# Patient Record
Sex: FEMALE | Race: WHITE | Employment: FULL TIME | URBAN - METROPOLITAN AREA
[De-identification: names, ages, dates, MRNs, and addresses within clinical notes are randomized per-mention and may not be internally consistent; named-entity substitution may affect disease eponyms.]

---

## 2019-03-07 ENCOUNTER — OFFICE VISIT (OUTPATIENT)
Dept: ENDOCRINOLOGY | Age: 59
End: 2019-03-07

## 2019-03-07 VITALS
TEMPERATURE: 96.3 F | RESPIRATION RATE: 16 BRPM | SYSTOLIC BLOOD PRESSURE: 139 MMHG | WEIGHT: 195 LBS | HEIGHT: 62 IN | OXYGEN SATURATION: 96 % | HEART RATE: 72 BPM | BODY MASS INDEX: 35.88 KG/M2 | DIASTOLIC BLOOD PRESSURE: 60 MMHG

## 2019-03-07 DIAGNOSIS — E05.90 SUBCLINICAL HYPERTHYROIDISM: Primary | ICD-10-CM

## 2019-03-07 RX ORDER — BISMUTH SUBSALICYLATE 262 MG
1 TABLET,CHEWABLE ORAL DAILY
COMMUNITY

## 2019-03-07 NOTE — PROGRESS NOTES
[facility]               Rebecca Conteh MD              [address] [address]           Patient Information  Date:3/7/2019  Name : Antonio Patterson 63 y.o.     YOB: 1960         Referred by: Amira Vazquez MD         Chief Complaint   Patient presents with   [facility] New Patient     referred by Dr. Iliana Beckford for Thyroid       History of present illness    Antonio Patterson is a 63 y.o. female      she is here for evaluation and management of abnormal thyroid function tests. she was found to have low TSH with normal free T4 in November 2018, repeated in January 2019. No prior history of thyroid disease. At the time she had the labs she was suffering from URI. No prior history of thyroid disorder  No nervousness, shakiness, racing of the heart  No iodine exposure, steroid use, biotin use , not on any thyroid supplements. No change in the size of the neck or neck pain. No dysphagia,dysphonia or dyspnea. No A fib or osteoporosis    Mother had nodular goiter, status post lobectomy, no history of thyroid cancer      Past Medical History:   Diagnosis Date    Mild intermittent asthma        Current Outpatient Medications   Medication Sig    multivitamin (ONE A DAY) tablet Take 1 Tab by mouth daily. No current facility-administered medications for this visit. Review of Systems:  All 10 systems reviewed and are negative other than mentioned in HPI      Physical Examination:  Blood pressure 139/60, pulse 72, temperature 96.3 °F (35.7 °C), temperature source Oral, resp. rate 16, height 5' 1.5\" (1.562 m), weight 195 lb (88.5 kg), SpO2 96 %. Body mass index is 36.25 kg/m².   - General: pleasant, no distress, good eye contact  - HEENT: no exopthalmos, no periorbital edema, no scleral/conjunctival injection, EOMI, no lid lag or stare  - Neck: supple, no thyromegaly, nodules, lymph nodes,   - Cardiovascular: regular,  normal S1 and S2, no murmurs  - Respiratory: clear to auscultation bilaterally  - Gastrointestinal: soft, nontender, nondistended, BS +  - Musculoskeletal: no proximal muscle weakness in upper or lower extremities  - Integumentary: No tremors, no edema  - Neurological: alert and oriented   - Psychiatric: normal mood and affect  - Skin - normal turgor    Data Reviewed:       [] Reviewed labs    Assessment/Plan:     1. Subclinical hyperthyroidism          she is asymptomatic. Differentials are - Thyroiditis , lab variation , toxic nodule. Clinically could not appreciate any nodules. We will repeat thyroid function tests including TSH and free T4 along with T3. No known history of atrial fibrillation, CVD, osteoporosis. Further tests based on blood tests. No indication to treat subclinical hyperthyroidism unless TSH is suppressed, history of atrial fibrillation or has osteoporosis. Follow-up Disposition:  Return in about 6 months (around 9/7/2019) for labs bnv and follow up. Thank you for allowing me to participate in the care of this patient. Sarah Galeano MD      Patient Mabel Liu verbalized understanding    Voice-recognition software was used to generate this report, which may result in some phonetic-based errors in the grammar and contents. Even though attempts were made to correct all the mistakes, some may have been missed and remained in the body of the report.

## 2019-03-07 NOTE — PROGRESS NOTES
Kwame Barger is a 63 y.o. female here for   Chief Complaint   Patient presents with    New Patient     referred by Dr. Liza Welch for Thyroid       1. Have you been to the ER, urgent care clinic since your last visit? Hospitalized since your last visit? -n/a    2. Have you seen or consulted any other health care providers outside of the [address] [place] [address] since your last visit?   Include any pap smears or colon screening.-n/a

## 2019-03-08 LAB
T4 FREE SERPL-MCNC: 1.58 NG/DL (ref 0.82–1.77)
TSH SERPL DL<=0.005 MIU/L-ACNC: 0.01 UIU/ML (ref 0.45–4.5)

## 2019-03-10 DIAGNOSIS — E05.90 SUBCLINICAL HYPERTHYROIDISM: Primary | ICD-10-CM

## 2019-03-11 ENCOUNTER — TELEPHONE (OUTPATIENT)
Dept: ENDOCRINOLOGY | Age: 59
End: 2019-03-11

## 2019-03-11 NOTE — TELEPHONE ENCOUNTER
Attempted to call. Unsuccessful. Left msg for Ramesh Rosenbaum to give us a call back at the office. A callback number was left.

## 2019-03-11 NOTE — TELEPHONE ENCOUNTER
Per Dr. Shaggy Valle, informed pt of result note, as noted above. Pt verbalized understanding with no further questions or concerns at this time.

## 2019-03-11 NOTE — TELEPHONE ENCOUNTER
----- Message from Denver Shi, MD sent at 3/10/2019  6:07 PM EDT -----  Thyroid test showed that she has a fast thyroid, need additional scanning to further evaluate.

## 2019-03-26 ENCOUNTER — HOSPITAL ENCOUNTER (OUTPATIENT)
Dept: NUCLEAR MEDICINE | Age: 59
Discharge: HOME | End: 2019-03-26
Attending: INTERNAL MEDICINE
Payer: COMMERCIAL

## 2019-03-26 DIAGNOSIS — E05.90 SUBCLINICAL HYPERTHYROIDISM: ICD-10-CM

## 2019-03-26 PROCEDURE — 78014 THYROID IMAGING W/BLOOD FLOW: CPT

## 2019-03-27 ENCOUNTER — HOSPITAL ENCOUNTER (OUTPATIENT)
Dept: NUCLEAR MEDICINE | Age: 59
Discharge: HOME | End: 2019-03-27
Attending: INTERNAL MEDICINE
Payer: COMMERCIAL

## 2019-03-27 ENCOUNTER — HOSPITAL ENCOUNTER (OUTPATIENT)
Dept: ULTRASOUND IMAGING | Age: 59
Discharge: HOME | End: 2019-03-27
Attending: INTERNAL MEDICINE
Payer: COMMERCIAL

## 2019-03-27 DIAGNOSIS — E05.90 SUBCLINICAL HYPERTHYROIDISM: ICD-10-CM

## 2019-03-27 PROCEDURE — 76536 US EXAM OF HEAD AND NECK: CPT

## 2019-03-28 ENCOUNTER — TELEPHONE (OUTPATIENT)
Dept: ENDOCRINOLOGY | Age: 59
End: 2019-03-28

## 2019-03-28 NOTE — PROGRESS NOTES
There are no nodules  seen on the thyroid gland which is good. It is slightly overactive, at this point there is no need for any medications. Would monitor it and then if it is worsening need medicines to control the thyroid.

## 2019-03-28 NOTE — TELEPHONE ENCOUNTER
Attempted to call. Unsuccessful. Left msg for Yasemin Gloria to give us a call back at the office. A callback number was left.

## 2019-03-28 NOTE — TELEPHONE ENCOUNTER
----- Message from Miguelina Ziegler MD sent at 3/28/2019  3:13 PM EDT -----  There are no nodules  seen on the thyroid gland which is good. It is slightly overactive, at this point there is no need for any medications. Would monitor it and then if it is worsening need medicines to control the thyroid.

## 2019-03-29 NOTE — TELEPHONE ENCOUNTER
Per Dr. Laxmi Gupta, informed pt of result note, as noted above. Pt verbalized understanding with no further questions or concerns at this time.

## 2019-09-05 DIAGNOSIS — E05.90 SUBCLINICAL HYPERTHYROIDISM: ICD-10-CM

## 2019-09-06 LAB
T4 FREE SERPL-MCNC: 1.63 NG/DL (ref 0.82–1.77)
TSH SERPL DL<=0.005 MIU/L-ACNC: <0.006 UIU/ML (ref 0.45–4.5)

## 2019-09-12 ENCOUNTER — OFFICE VISIT (OUTPATIENT)
Dept: ENDOCRINOLOGY | Age: 59
End: 2019-09-12

## 2019-09-12 VITALS
HEART RATE: 86 BPM | TEMPERATURE: 97.6 F | BODY MASS INDEX: 34.78 KG/M2 | WEIGHT: 189 LBS | SYSTOLIC BLOOD PRESSURE: 152 MMHG | OXYGEN SATURATION: 98 % | RESPIRATION RATE: 16 BRPM | HEIGHT: 62 IN | DIASTOLIC BLOOD PRESSURE: 71 MMHG

## 2019-09-12 DIAGNOSIS — E05.90 SUBCLINICAL HYPERTHYROIDISM: Primary | ICD-10-CM

## 2019-09-12 DIAGNOSIS — E04.1 THYROID NODULE: ICD-10-CM

## 2019-09-12 DIAGNOSIS — E05.00 GRAVES DISEASE: ICD-10-CM

## 2019-09-12 RX ORDER — METHIMAZOLE 5 MG/1
5 TABLET ORAL
Qty: 90 TAB | Refills: 3 | Status: SHIPPED | OUTPATIENT
Start: 2019-09-12 | End: 2020-07-29 | Stop reason: SDUPTHER

## 2019-09-12 NOTE — PROGRESS NOTES
Kaylyn Rondon is a 63 y.o. female here for   Chief Complaint   Patient presents with    Thyroid Problem       1. Have you been to the ER, urgent care clinic since your last visit? Hospitalized since your last visit? -no    2. Have you seen or consulted any other health care providers outside of the [address] [place] [address] since your last visit?   Include any pap smears or colon screening.-no

## 2019-09-12 NOTE — LETTER
9/12/19 Patient: Julien Osullivan YOB: 1960 Date of Visit: 9/12/2019 Raghu Sweeney MD 
[address] [facility] [address] [place] [address] VIA Facsimile: [phone] Dear Raghu Sweeney MD, Thank you for referring Ms. Christiano Thibodeaux to [address] [place] [address] for evaluation. My notes for this consultation are attached. If you have questions, please do not hesitate to call me. I look forward to following your patient along with you. Sincerely, Mio Peter MD

## 2019-09-12 NOTE — PROGRESS NOTES
Vinod Vega MD              [address] [id number]           Patient Information  Date:9/12/2019  Name : Tayler Viramontes 63 y.o.     YOB: 1960         Referred by: Maria De Jesus Barrios MD         Chief Complaint   Patient presents with    Thyroid Problem       History of present illness    Tayler Viramontes is a 63 y.o. female       she was found to have low TSH with normal free T4 in November 2018, repeated in January 2019. TSH suppressed in September 2019  Thyroid uptake and scan showed elevated uptake of 36%, no dominant nodule on ultrasound  Chronic tremors which has not changed       No A fib or osteoporosis    Mother had nodular goiter, status post lobectomy, no history of thyroid cancer      Past Medical History:   Diagnosis Date    Mild intermittent asthma        Current Outpatient Medications   Medication Sig    multivitamin (ONE A DAY) tablet Take 1 Tab by mouth daily.  methIMAzole (TAPAZOLE) 5 mg tablet Take 1 Tab by mouth every morning. No current facility-administered medications for this visit. Review of Systems:  All 10 systems reviewed and are negative other than mentioned in HPI      Physical Examination:  Blood pressure 152/71, pulse 86, temperature 97.6 °F (36.4 °C), temperature source Oral, resp. rate 16, height 5' 1.5\" (1.562 m), weight 189 lb (85.7 kg), SpO2 98 %. Body mass index is 35.13 kg/m².   - General: pleasant, no distress, good eye contact  - HEENT: no exopthalmos, no periorbital edema, no scleral/conjunctival injection, EOMI, no lid lag or stare  - Neck: supple, no thyromegaly, nodules, lymph nodes,   - Cardiovascular: regular,  normal S1 and S2, no murmurs  - Respiratory: clear to auscultation bilaterally  - Gastrointestinal: soft, nontender, nondistended, BS +  - Musculoskeletal: no proximal muscle weakness in upper or lower extremities  - Integumentary: No tremors, no edema  - Neurological: alert and oriented   - Psychiatric: normal mood and affect  - Skin - normal turgor    Data Reviewed:       [] Reviewed labs    Assessment/Plan:     1. Subclinical hyperthyroidism    2. Graves disease    3. Thyroid nodule        Early Graves' disease  Methimazole started September 2019    Thyroid nodule: Subcentimeter, no follow-up ultrasound needed IR FNA        Follow-up and Dispositions    · Return in about 6 months (around 3/12/2020) for labs 3 M and 6 M . Thank you for allowing me to participate in the care of this patient. Jason North MD      Patient Hollis Panchal verbalized understanding    Voice-recognition software was used to generate this report, which may result in some phonetic-based errors in the grammar and contents. Even though attempts were made to correct all the mistakes, some may have been missed and remained in the body of the report.

## 2020-01-07 ENCOUNTER — HOSPITAL ENCOUNTER (OUTPATIENT)
Dept: LAB | Age: 60
Discharge: HOME | End: 2020-01-07

## 2020-01-07 DIAGNOSIS — E05.90 SUBCLINICAL HYPERTHYROIDISM: ICD-10-CM

## 2020-01-07 LAB
T4 FREE SERPL-MCNC: 1.1 NG/DL (ref 0.8–1.5)
TSH SERPL DL<=0.05 MIU/L-ACNC: 0.04 UIU/ML (ref 0.36–3.74)

## 2020-02-13 ENCOUNTER — OFFICE VISIT (OUTPATIENT)
Dept: OBGYN CLINIC | Age: 60
End: 2020-02-13

## 2020-02-13 VITALS
BODY MASS INDEX: 36.63 KG/M2 | WEIGHT: 194 LBS | HEART RATE: 69 BPM | HEIGHT: 61 IN | DIASTOLIC BLOOD PRESSURE: 60 MMHG | SYSTOLIC BLOOD PRESSURE: 125 MMHG

## 2020-02-13 DIAGNOSIS — Z01.419 ROUTINE GYNECOLOGICAL EXAMINATION: Primary | ICD-10-CM

## 2020-02-13 DIAGNOSIS — Z11.51 SPECIAL SCREENING EXAMINATION FOR HUMAN PAPILLOMAVIRUS (HPV): ICD-10-CM

## 2020-02-13 DIAGNOSIS — E66.01 SEVERE OBESITY (HCC): ICD-10-CM

## 2020-02-13 NOTE — PATIENT INSTRUCTIONS
Well Visit, Women 48 to 72: Care Instructions  Your Care Instructions    Physical exams can help you stay healthy. Your doctor has checked your overall health and may have suggested ways to take good care of yourself. He or she also may have recommended tests. At home, you can help prevent illness with healthy eating, regular exercise, and other steps. Follow-up care is a key part of your treatment and safety. Be sure to make and go to all appointments, and call your doctor if you are having problems. It's also a good idea to know your test results and keep a list of the medicines you take. How can you care for yourself at home? · Reach and stay at a healthy weight. This will lower your risk for many problems, such as obesity, diabetes, heart disease, and high blood pressure. · Get at least 30 minutes of exercise on most days of the week. Walking is a good choice. You also may want to do other activities, such as running, swimming, cycling, or playing tennis or team sports. · Do not smoke. Smoking can make health problems worse. If you need help quitting, talk to your doctor about stop-smoking programs and medicines. These can increase your chances of quitting for good. · Protect your skin from too much sun. When you're outdoors from 10 a.m. to 4 p.m., stay in the shade or cover up with clothing and a hat with a wide brim. Wear sunglasses that block UV rays. Even when it's cloudy, put broad-spectrum sunscreen (SPF 30 or higher) on any exposed skin. · See a dentist one or two times a year for checkups and to have your teeth cleaned. · Wear a seat belt in the car. Follow your doctor's advice about when to have certain tests. These tests can spot problems early. · Cholesterol. Your doctor will tell you how often to have this done based on your age, family history, or other things that can increase your risk for heart attack and stroke. · Blood pressure.  Have your blood pressure checked during a routine doctor visit. Your doctor will tell you how often to check your blood pressure based on your age, your blood pressure results, and other factors. · Mammogram. Ask your doctor how often you should have a mammogram, which is an X-ray of your breasts. A mammogram can spot breast cancer before it can be felt and when it is easiest to treat. · Pap test and pelvic exam. Ask your doctor how often you should have a Pap test. You may not need to have a Pap test as often as you used to. · Vision. Have your eyes checked every year or two or as often as your doctor suggests. Some experts recommend that you have yearly exams for glaucoma and other age-related eye problems starting at age 48. · Hearing. Tell your doctor if you notice any change in your hearing. You can have tests to find out how well you hear. · Diabetes. Ask your doctor whether you should have tests for diabetes. · Colorectal cancer. Your risk for colorectal cancer gets higher as you get older. Some experts say that adults should start regular screening at age 48 and stop at age 76. Others say to start before age 48 or continue after age 76. Talk with your doctor about your risk and when to start and stop screening. · Thyroid disease. Talk to your doctor about whether to have your thyroid checked as part of a regular physical exam. Women have an increased chance of a thyroid problem. · Osteoporosis. You should begin tests for bone density at age 72. If you are younger than 72, ask your doctor whether you have factors that may increase your risk for this disease. You may want to have this test before age 72. · Heart attack and stroke risk. At least every 4 to 6 years, you should have your risk for heart attack and stroke assessed. Your doctor uses factors such as your age, blood pressure, cholesterol, and whether you smoke or have diabetes to show what your risk for a heart attack or stroke is over the next 10 years.   When should you call for help?  Watch closely for changes in your health, and be sure to contact your doctor if you have any problems or symptoms that concern you. Where can you learn more? Go to http://nadia-hitesh.info/. Enter W612 in the search box to learn more about \"Well Visit, Women 50 to 72: Care Instructions. \"  Current as of: December 13, 2018  Content Version: 12.2  © 9939-5436 [employer]. Care instructions adapted under license by [employer] (which disclaims liability or warranty for this information). If you have questions about a medical condition or this instruction, always ask your healthcare professional. [address] any warranty or liability for your use of this information.

## 2020-02-13 NOTE — PROGRESS NOTES
Tara An is a No obstetric history on file. ,  63 y.o. female [facility]  who presents for her annual checkup. She is having no significant problems. Menstrual status:    Her periods are absent in flow. She denies dysmenorrhea. She reports no premenstrual symptoms. The patient is not using HRT. Contraception:    The current method of family planning is post menopausal status. Sexual history:    She  reports previously being sexually active. Medical conditions:    Since her last annual GYN exam about unknown per patient ago, she has had the following changes in her health history: none. Pap and Mammogram History:    Her most recent Pap smear was normal obtained unknown per patient year(s) ago. Per patient \"I had abnormal Pap when I was maybe 19\". The patient had her mammogram today in our office. Breast Cancer History/Substance Abuse:    She has no and a family history of breast cancer. Osteoporosis History:    Family history does not include a first or second degree relative with osteopenia or osteoporosis. A bone density scan was not obtained. She is not currently taking calcium and vit D. Past Medical History:   Diagnosis Date    Mild intermittent asthma      Past Surgical History:   Procedure Laterality Date    HX  SECTION       Current Outpatient Medications   Medication Sig Dispense Refill    methIMAzole (TAPAZOLE) 5 mg tablet Take 1 Tab by mouth every morning. 90 Tab 3    multivitamin (ONE A DAY) tablet Take 1 Tab by mouth daily.        Allergies: Aspirin   Social History     Socioeconomic History    Marital status:      Spouse name: Not on file    Number of children: Not on file    Years of education: Not on file    Highest education level: Not on file   Occupational History    Not on file   Social Needs    Financial resource strain: Not on file    Food insecurity:     Worry: Not on file     Inability: Not on file   [facility] Transportation needs:     Medical: Not on file     Non-medical: Not on file   Tobacco Use    Smoking status: Former Smoker    Smokeless tobacco: Never Used   Substance and Sexual Activity    Alcohol use: Yes     Drinks per session: 1 or 2     Comment: occasional    Drug use: No    Sexual activity: Not on file   Lifestyle    Physical activity:     Days per week: Not on file     Minutes per session: Not on file    Stress: Not on file   Relationships    Social connections:     Talks on phone: Not on file     Gets together: Not on file     Attends Jainism service: Not on file     Active member of club or organization: Not on file     Attends meetings of clubs or organizations: Not on file     Relationship status: Not on file    Intimate partner violence:     Fear of current or ex partner: Not on file     Emotionally abused: Not on file     Physically abused: Not on file     Forced sexual activity: Not on file   Other Topics Concern    Not on file   Social History Narrative    Not on file     Tobacco History:  reports that she has quit smoking. She has never used smokeless tobacco.  Alcohol Abuse:  reports current alcohol use. Drug Abuse:  reports no history of drug use.   Patient Active Problem List   Diagnosis Code    Subclinical hyperthyroidism E05.90    Graves disease E05.00    Thyroid nodule E04.1         Review of Systems - History obtained from the patient  Constitutional: negative for weight loss, fever, night sweats  HEENT: negative for hearing loss, earache, congestion, snoring, sorethroat  CV: negative for chest pain, palpitations, edema  Resp: negative for cough, shortness of breath, wheezing  GI: negative for change in bowel habits, abdominal pain, black or bloody stools  : negative for frequency, dysuria, hematuria, vaginal discharge  MSK: negative for back pain, joint pain, muscle pain  Breast: negative for breast lumps, nipple discharge, galactorrhea  Skin :negative for itching, rash, hives  Neuro: negative for dizziness, headache, confusion, weakness  Psych: negative for anxiety, depression, change in mood  Heme/lymph: negative for bleeding, bruising, pallor    Physical Exam    Visit Vitals  /60   Pulse 69   Ht 5' 1\" (1.549 m)   Wt 194 lb (88 kg)   BMI 36.66 kg/m²     Constitutional  · Appearance: well-nourished, well developed, alert, in no acute distress    HENT  · Head and Face: appears normal    Neck  · Inspection/Palpation: normal appearance, no masses or tenderness  · Lymph Nodes: no lymphadenopathy present  · Thyroid: gland size normal, nontender, no nodules or masses present on palpation    Chest  · Respiratory Effort: breathing normal  · Auscultation: normal breath sounds    Cardiovascular  · Heart:  · Auscultation: regular rate and rhythm without murmur    Breasts  · Inspection of Breasts: breasts symmetrical, no skin changes, no discharge present, nipple appearance normal, no skin retraction present  · Palpation of Breasts and Axillae: no masses present on palpation, no breast tenderness  · Axillary Lymph Nodes: no lymphadenopathy present    Gastrointestinal  · Abdominal Examination: abdomen non-tender to palpation, normal bowel sounds, no masses present  · Liver and spleen: no hepatomegaly present, spleen not palpable  · Hernias: no hernias identified    Skin  · General Inspection: no rash, no lesions identified    Neurologic/Psychiatric  · Mental Status:  · Orientation: grossly oriented to person, place and time  · Mood and Affect: mood normal, affect appropriate    Genitourinary  · External Genitalia: normal appearance for age, no discharge present, no tenderness present, no inflammatory lesions present, no masses present, no atrophy present  · Vagina: normal vaginal vault without central or paravaginal defects, no discharge present, no inflammatory lesions present, no masses present  · Bladder: non-tender to palpation  · Urethra: appears normal  · Cervix: normal   · Uterus: normal size, shape and consistency  · Adnexa: no adnexal tenderness present, no adnexal masses present  · Perineum: perineum within normal limits, no evidence of trauma, no rashes or skin lesions present  · Anus: anus within normal limits, no hemorrhoids present  · Inguinal Lymph Nodes: no lymphadenopathy present    Assessment:  Routine gynecologic examination  Her current medical status is satisfactory with no evidence of significant gynecologic issues.     Plan:  Counseled re: diet, exercise, healthy lifestyle  Return for yearly wellness visits  Rec annual mammogram  Pap/HPV

## 2020-02-18 LAB
CYTOLOGIST CVX/VAG CYTO: NORMAL
CYTOLOGY CVX/VAG DOC CYTO: NORMAL
CYTOLOGY CVX/VAG DOC THIN PREP: NORMAL
DX ICD CODE: NORMAL
HPV I/H RISK 1 DNA CVX QL PROBE+SIG AMP: NEGATIVE
Lab: NORMAL
OTHER STN SPEC: NORMAL
STAT OF ADQ CVX/VAG CYTO-IMP: NORMAL

## 2020-03-18 ENCOUNTER — HOSPITAL ENCOUNTER (OUTPATIENT)
Dept: LAB | Age: 60
Discharge: HOME | End: 2020-03-18

## 2020-03-18 ENCOUNTER — LAB ONLY (OUTPATIENT)
Dept: ENDOCRINOLOGY | Age: 60
End: 2020-03-18

## 2020-03-18 DIAGNOSIS — E05.90 SUBCLINICAL HYPERTHYROIDISM: ICD-10-CM

## 2020-03-18 DIAGNOSIS — E05.90 SUBCLINICAL HYPERTHYROIDISM: Primary | ICD-10-CM

## 2020-03-18 LAB
T4 FREE SERPL-MCNC: 0.9 NG/DL (ref 0.8–1.5)
TSH SERPL DL<=0.05 MIU/L-ACNC: 3.43 UIU/ML (ref 0.36–3.74)

## 2020-03-19 ENCOUNTER — TELEPHONE (OUTPATIENT)
Dept: ENDOCRINOLOGY | Age: 60
End: 2020-03-19

## 2020-07-29 ENCOUNTER — OFFICE VISIT (OUTPATIENT)
Dept: ENDOCRINOLOGY | Age: 60
End: 2020-07-29

## 2020-07-29 VITALS
RESPIRATION RATE: 18 BRPM | SYSTOLIC BLOOD PRESSURE: 148 MMHG | HEART RATE: 75 BPM | DIASTOLIC BLOOD PRESSURE: 70 MMHG | OXYGEN SATURATION: 96 % | TEMPERATURE: 97.1 F | HEIGHT: 61 IN | WEIGHT: 205 LBS | BODY MASS INDEX: 38.71 KG/M2

## 2020-07-29 DIAGNOSIS — E66.01 SEVERE OBESITY (HCC): ICD-10-CM

## 2020-07-29 DIAGNOSIS — E04.1 THYROID NODULE: ICD-10-CM

## 2020-07-29 DIAGNOSIS — E05.90 SUBCLINICAL HYPERTHYROIDISM: ICD-10-CM

## 2020-07-29 DIAGNOSIS — E05.00 GRAVES DISEASE: ICD-10-CM

## 2020-07-29 DIAGNOSIS — E05.90 SUBCLINICAL HYPERTHYROIDISM: Primary | ICD-10-CM

## 2020-07-29 RX ORDER — METHIMAZOLE 5 MG/1
5 TABLET ORAL
Qty: 90 TAB | Refills: 3 | Status: SHIPPED | OUTPATIENT
Start: 2020-07-29 | End: 2020-08-03 | Stop reason: SDUPTHER

## 2020-07-29 NOTE — PROGRESS NOTES
Raquel Amaya MD      Patient Information  Date:7/29/2020  Name : Izabella Santos 62 y.o.     YOB: 1960         Referred by: Sussy Benitez MD         Chief Complaint   Patient presents with    Thyroid Problem       History of present illness    Izabella Santos is a 62 y.o. female       she was found to have low TSH with normal free T4 in November 2018, repeated in January 2019. TSH suppressed in September 2019  Thyroid uptake and scan showed elevated uptake of 36%, no dominant nodule on ultrasound  Chronic tremors which has not changed  She is on methimazole which she is taking it consistently  No fever or cough  No sores in the mouth     No A fib or osteoporosis    Mother had nodular goiter, status post lobectomy, no history of thyroid cancer      Past Medical History:   Diagnosis Date    Mild intermittent asthma     Routine Papanicolaou smear 2/13/2020 neg HPV neg    Thyroid disease        Current Outpatient Medications   Medication Sig    methIMAzole (TAPAZOLE) 5 mg tablet Take 1 Tab by mouth every morning.  multivitamin (ONE A DAY) tablet Take 1 Tab by mouth daily. No current facility-administered medications for this visit. Review of Systems:  All 10 systems reviewed and are negative other than mentioned in HPI      Physical Examination:  Blood pressure 148/70, pulse 75, temperature 97.1 °F (36.2 °C), temperature source Oral, resp. rate 18, height 5' 1\" (1.549 m), weight 205 lb (93 kg), SpO2 96 %. Body mass index is 38.73 kg/m².   - General: pleasant, no distress, good eye contact  - HEENT: no exopthalmos, no periorbital edema, no scleral/conjunctival injection, EOMI, no lid lag or stare  - Neck: supple, no thyromegaly, nodules, lymph nodes,   - Cardiovascular: regular,  normal S1 and S2, no murmurs  - Respiratory: clear to auscultation bilaterally  - Gastrointestinal: soft, nontender, nondistended, BS +  - Musculoskeletal: no proximal muscle weakness in upper or lower extremities  - Integumentary: No tremors, no edema  - Neurological: alert and oriented   - Psychiatric: normal mood and affect  - Skin - normal turgor    Data Reviewed:       [] Reviewed labs    Assessment/Plan:     1. Subclinical hyperthyroidism    2. Graves disease    3. Thyroid nodule        Early Graves' disease  Methimazole started September 2019    Thyroid nodule: Subcentimeter, no follow-up ultrasound needed    Nonmorbid obesity: Lifestyle changes    Blood pressure is elevated today, monitor          Thank you for allowing me to participate in the care of this patient. Nicolle Bailey MD      Patient [facility] verbalized understanding    Voice-recognition software was used to generate this report, which may result in some phonetic-based errors in the grammar and contents. Even though attempts were made to correct all the mistakes, some may have been missed and remained in the body of the report.

## 2020-07-29 NOTE — LETTER
7/30/20 Patient: Faustino Laurent YOB: 1960 Date of Visit: 7/29/2020 Emmanuel Black MD 
[address] [address] [place] [address] VIA Facsimile: [phone] Dear Emmanuel Black MD, Thank you for referring Ms. Nadiya Bates to [id number] [place] [address] for evaluation. My notes for this consultation are attached. If you have questions, please do not hesitate to call me. I look forward to following your patient along with you. Sincerely, Radhika Christian MD

## 2020-07-29 NOTE — PROGRESS NOTES
Jeni Smith is a 62 y.o. female here for   Chief Complaint   Patient presents with    Thyroid Problem       1. Have you been to the ER, urgent care clinic since your last visit? Hospitalized since your last visit? -no    2. Have you seen or consulted any other health care providers outside of the [address] [place] [address] since your last visit? Include any pap smears or colon screening. -PCP and GYN

## 2020-08-01 LAB
T4 FREE SERPL-MCNC: 0.98 NG/DL (ref 0.82–1.77)
TSH SERPL DL<=0.005 MIU/L-ACNC: 6.18 UIU/ML (ref 0.45–4.5)

## 2020-08-03 ENCOUNTER — TELEPHONE (OUTPATIENT)
Dept: ENDOCRINOLOGY | Age: 60
End: 2020-08-03

## 2020-08-03 DIAGNOSIS — E05.90 SUBCLINICAL HYPERTHYROIDISM: ICD-10-CM

## 2020-08-03 RX ORDER — METHIMAZOLE 5 MG/1
2.5 TABLET ORAL
Qty: 45 TAB | Refills: 3 | Status: SHIPPED | OUTPATIENT
Start: 2020-08-03 | End: 2021-01-29

## 2020-08-03 NOTE — TELEPHONE ENCOUNTER
----- Message from Krishan Haile MD sent at 8/2/2020  7:39 AM EDT -----  Decrease methimazole to 2.5 mg, which is half tablet of 5 mg

## 2020-08-03 NOTE — TELEPHONE ENCOUNTER
Per Dr. Phyllis Stuart, informed pt of result note, as noted above. Pt verbalized understanding and would like updated dose to pharmacy. Informed her that 5 mg is the lowest form methimazole comes in. Pt verbalized understanding with no further questions or concerns at this time.

## 2021-01-01 DIAGNOSIS — E04.1 THYROID NODULE: ICD-10-CM

## 2021-01-01 DIAGNOSIS — E05.90 SUBCLINICAL HYPERTHYROIDISM: ICD-10-CM

## 2021-01-01 DIAGNOSIS — E05.00 GRAVES DISEASE: ICD-10-CM

## 2021-01-22 LAB
T4 FREE SERPL-MCNC: 1.1 NG/DL (ref 0.8–1.5)
TSH SERPL DL<=0.05 MIU/L-ACNC: 2.53 UIU/ML (ref 0.36–3.74)

## 2021-01-29 ENCOUNTER — OFFICE VISIT (OUTPATIENT)
Dept: ENDOCRINOLOGY | Age: 61
End: 2021-01-29
Payer: COMMERCIAL

## 2021-01-29 VITALS
HEART RATE: 71 BPM | OXYGEN SATURATION: 96 % | TEMPERATURE: 97.6 F | RESPIRATION RATE: 16 BRPM | WEIGHT: 201 LBS | BODY MASS INDEX: 37.95 KG/M2 | SYSTOLIC BLOOD PRESSURE: 143 MMHG | DIASTOLIC BLOOD PRESSURE: 65 MMHG | HEIGHT: 61 IN

## 2021-01-29 DIAGNOSIS — E04.1 THYROID NODULE: ICD-10-CM

## 2021-01-29 DIAGNOSIS — E05.90 SUBCLINICAL HYPERTHYROIDISM: Primary | ICD-10-CM

## 2021-01-29 DIAGNOSIS — E05.00 GRAVES DISEASE: ICD-10-CM

## 2021-01-29 PROCEDURE — 99214 OFFICE O/P EST MOD 30 MIN: CPT | Performed by: INTERNAL MEDICINE

## 2021-01-29 RX ORDER — ALBUTEROL SULFATE 90 UG/1
2 AEROSOL, METERED RESPIRATORY (INHALATION)
COMMUNITY

## 2021-01-29 NOTE — LETTER
1/30/2021 Patient: Marilu Mail YOB: 1960 Date of Visit: 1/29/2021 Rickie Wong MD 
[address] [place] [address] Via Fax: [phone] Dear Rickie Wong MD, Thank you for referring Ms. Elissa García to [id number] [place] [address] for evaluation. My notes for this consultation are attached. If you have questions, please do not hesitate to call me. I look forward to following your patient along with you. Sincerely, Juan Prajapati MD

## 2021-01-29 NOTE — PROGRESS NOTES
Catia Varner is a 62 y.o. female here for   Chief Complaint   Patient presents with    Thyroid Problem       1. Have you been to the ER, urgent care clinic since your last visit? Hospitalized since your last visit? -no    2. Have you seen or consulted any other health care providers outside of the [address] [place] [address] since your last visit?   Include any pap smears or colon screening.-no

## 2021-01-29 NOTE — PROGRESS NOTES
Jasen Anders MD      Patient Information  Date:1/30/2021  Name : Mario Fang 62 y.o.     YOB: 1960         Referred by: Al Melendrez MD         Chief Complaint   Patient presents with    Thyroid Problem       History of present illness    Mario Fang is a 62 y.o. female here for follow-up     she was found to have low TSH with normal free T4 in November 2018, repeated in January 2019. TSH suppressed in September 2019  Thyroid uptake and scan showed elevated uptake of 36%, no dominant nodule on ultrasound  Chronic tremors which has not changed  She is on methimazole which she is taking it consistently  No fever or cough  No sores in the mouth     No A fib or osteoporosis    Mother had nodular goiter, status post lobectomy, no history of thyroid cancer      Past Medical History:   Diagnosis Date    Mild intermittent asthma     Routine Papanicolaou smear 2/13/2020 neg HPV neg    Thyroid disease        Current Outpatient Medications   Medication Sig    albuterol (PROVENTIL HFA, VENTOLIN HFA, PROAIR HFA) 90 mcg/actuation inhaler Take 2 Puffs by inhalation every four (4) hours as needed for Wheezing.  multivitamin (ONE A DAY) tablet Take 1 Tab by mouth daily. No current facility-administered medications for this visit. Review of Systems:  All 10 systems reviewed and are negative other than mentioned in HPI      Physical Examination:  Blood pressure (!) 143/65, pulse 71, temperature 97.6 °F (36.4 °C), temperature source Oral, resp. rate 16, height 5' 1\" (1.549 m), weight 201 lb (91.2 kg), SpO2 96 %. Body mass index is 37.98 kg/m².   - General: pleasant, no distress, good eye contact  - HEENT: no exopthalmos, no periorbital edema, no scleral/conjunctival injection, EOMI, no lid lag or stare  - Neck: supple, no thyromegaly, nodules, lymph nodes,   - Cardiovascular: regular,  normal S1 and S2, no murmurs  - Respiratory: clear to auscultation bilaterally  - Gastrointestinal: soft, nontender, nondistended, BS +  - Musculoskeletal: no proximal muscle weakness in upper or lower extremities  - Integumentary: No tremors, no edema  - Neurological: alert and oriented   - Psychiatric: normal mood and affect  - Skin - normal turgor    Data Reviewed:       [] Reviewed labs    Assessment/Plan:     1. Subclinical hyperthyroidism    2. Graves disease        Early Graves' disease  Methimazole started September 2019  Stop methimazole at the end of March 2021  Recheck thyroid function test end of June 2021, if normal annual thyroid function tests    Thyroid nodule: Subcentimeter, no follow-up ultrasound needed    Nonmorbid obesity: Lifestyle changes          Follow-up and Dispositions    · Return if symptoms worsen or fail to improve. Thank you for allowing me to participate in the care of this patient. Santos Betancourt MD      Patient Vanessa  verbalized understanding    Voice-recognition software was used to generate this report, which may result in some phonetic-based errors in the grammar and contents. Even though attempts were made to correct all the mistakes, some may have been missed and remained in the body of the report.

## 2021-08-28 LAB
T4 FREE SERPL-MCNC: 1.18 NG/DL (ref 0.82–1.77)
TSH SERPL DL<=0.005 MIU/L-ACNC: 2.84 UIU/ML (ref 0.45–4.5)

## 2021-08-29 NOTE — PROGRESS NOTES
Thyroid tests are normal.  Plan was to stop the methimazole and please confirm if she has stopped it. Follow-up in a year, TSH, free T4 before next visit

## 2021-08-30 ENCOUNTER — TELEPHONE (OUTPATIENT)
Dept: ENDOCRINOLOGY | Age: 61
End: 2021-08-30

## 2021-08-30 DIAGNOSIS — E05.90 SUBCLINICAL HYPERTHYROIDISM: Primary | ICD-10-CM

## 2021-08-30 NOTE — TELEPHONE ENCOUNTER
Pt confirmed she did stop methimazole. Informed pt of results below. Pt verbalized understanding. Order placed for pt,  per Verbal Order with read back from Dr Isaias Hunt 8/30/2021        ----- Message from Carmen Kiser MD sent at 8/29/2021  7:23 AM EDT -----  Thyroid tests are normal.  Plan was to stop the methimazole and please confirm if she has stopped it. Follow-up in a year, TSH, free T4 before next visit

## 2021-10-14 NOTE — PATIENT INSTRUCTIONS
Potential side effects of methimazole are rash,low blood count and rarely liver inflammation. If you get high grade fever,bad sorethroat,or sores in the mouth ,please call the office for blood tests. If white blood count is low,the methimazole should be stopped and the counts will normalize in 7 to 10 days. show

## 2022-03-18 PROBLEM — E04.1 THYROID NODULE: Status: ACTIVE | Noted: 2019-09-12

## 2022-03-19 PROBLEM — E05.90 SUBCLINICAL HYPERTHYROIDISM: Status: ACTIVE | Noted: 2019-09-12

## 2022-03-20 PROBLEM — E66.01 SEVERE OBESITY (HCC): Status: ACTIVE | Noted: 2020-02-13

## 2022-03-20 PROBLEM — E05.00 GRAVES DISEASE: Status: ACTIVE | Noted: 2019-09-12

## 2023-05-19 RX ORDER — ALBUTEROL SULFATE 90 UG/1
2 AEROSOL, METERED RESPIRATORY (INHALATION) EVERY 4 HOURS PRN
COMMUNITY

## 2023-07-21 NOTE — TELEPHONE ENCOUNTER
----- Message from Vishal Chua MD sent at 3/19/2020 11:05 AM EDT -----  Tests are normal   No change in the dose  appt can be postponed to 4 months
Informed pt of Dr. Ronnell Gonzales note. Pt verbalized understanding with no further questions or concerns at this time.  will contact pt with new appt date and time.
room air

## 2023-08-09 ENCOUNTER — TELEPHONE (OUTPATIENT)
Age: 63
End: 2023-08-09

## 2023-08-09 DIAGNOSIS — E05.90 SUBCLINICAL HYPERTHYROIDISM: Primary | ICD-10-CM

## 2023-08-09 NOTE — TELEPHONE ENCOUNTER
Patient called stating she would like to have labs done for thyroid. She states Dr. Flor Mora told her that if she had any problems to call office and request labs. She is seeing an increase in weight loss. Real thirsty . Would like to discuss.

## 2023-08-13 LAB
T4 FREE SERPL-MCNC: 1.09 NG/DL (ref 0.82–1.77)
TSH SERPL DL<=0.005 MIU/L-ACNC: 5.8 UIU/ML (ref 0.45–4.5)

## 2023-08-17 ENCOUNTER — TELEMEDICINE (OUTPATIENT)
Age: 63
End: 2023-08-17
Payer: COMMERCIAL

## 2023-08-17 DIAGNOSIS — E04.1 THYROID NODULE: Primary | ICD-10-CM

## 2023-08-17 DIAGNOSIS — E03.8 SUBCLINICAL HYPOTHYROIDISM: ICD-10-CM

## 2023-08-17 PROCEDURE — 99214 OFFICE O/P EST MOD 30 MIN: CPT | Performed by: INTERNAL MEDICINE

## 2023-08-17 NOTE — PROGRESS NOTES
Star Reeves is a 59 y.o. female here for   Chief Complaint   Patient presents with    Thyroid Problem       1. Have you been to the ER, urgent care clinic since your last visit? Hospitalized since your last visit? -no    2. Have you seen or consulted any other health care providers outside of the [address] [place] [address] Avenue since your last visit?   Include any pap smears or colon screening.-cardiology

## 2023-08-17 NOTE — PROGRESS NOTES
Rosio Penny MD         Patient Information   Date:1/30/2021   Name : Lucila Brown 62 y.o.       YOB: 1960           Referred by: Bj Watts MD       Lucila Brown  was evaluated through a synchronous (real-time) audio-video encounter. The patient (or guardian if applicable) is aware that this is a billable service, which includes applicable co-pays. This Virtual Visit was conducted with patient's (and/or legal guardian's) consent. Patient identification was verified, and a caregiver was present when appropriate. The patient was located at Home:   Provider was located at [facility] ([address]): [facility],  [address] [facility]         Chief Complaint   Patient presents with    Thyroid Problem               History of present illness      Lucila Brown is a 62 y.o.  female here for follow-up     She was found to have low TSH with normal free T4 in November 2018, repeated  in January 2019. TSH suppressed in September 2019   Thyroid uptake and scan showed elevated uptake of 36%, no dominant nodule on ultrasound   Chronic tremors which has not changed  She was treated with methimazole, off methimazole in 2021    She called with weight loss of 25 pounds in 3 months, she was not able to eat well, decreased appetite, no fever, no infection known and now it has improved on its own.   TSH was 5, free T4 normal  No biotin        No A fib or osteoporosis      Mother had nodular goiter, status post lobectomy, no history of thyroid cancer          Physical Examination:    General: pleasant, no distress, good eye contact  HEENT: no exophthalmos, no periorbital edema, EOMI  Neck: No visible thyromegaly  RS: Normal respiratory effort  Musculoskeletal: no tremors  Neurological: alert and oriented  Psychiatric: normal mood and affect  Skin: Normal color   Data Reviewed:        Lab Results   Component Value Date    TSH

## 2023-09-29 ENCOUNTER — HOSPITAL ENCOUNTER (EMERGENCY)
Facility: HOSPITAL | Age: 63
Discharge: HOME | End: 2023-09-29
Attending: STUDENT IN AN ORGANIZED HEALTH CARE EDUCATION/TRAINING PROGRAM
Payer: COMMERCIAL

## 2023-09-29 VITALS
BODY MASS INDEX: 32.39 KG/M2 | HEIGHT: 60 IN | WEIGHT: 165 LBS | HEART RATE: 77 BPM | DIASTOLIC BLOOD PRESSURE: 77 MMHG | TEMPERATURE: 98.9 F | SYSTOLIC BLOOD PRESSURE: 146 MMHG | OXYGEN SATURATION: 97 % | RESPIRATION RATE: 16 BRPM

## 2023-09-29 DIAGNOSIS — R73.9 HYPERGLYCEMIA: Primary | ICD-10-CM

## 2023-09-29 LAB
ALBUMIN SERPL-MCNC: 4.2 G/DL (ref 3.5–5.2)
ALBUMIN/GLOB SERPL: 1.4 (ref 1.1–2.2)
ALP SERPL-CCNC: 139 U/L (ref 35–104)
ALT SERPL-CCNC: 10 U/L (ref 10–35)
ANION GAP SERPL CALC-SCNC: 16 MMOL/L (ref 5–15)
APPEARANCE UR: ABNORMAL
AST SERPL-CCNC: 12 U/L (ref 10–35)
BACTERIA URNS QL MICRO: ABNORMAL /HPF
BASE DEFICIT BLDV-SCNC: 1.1 MMOL/L
BASOPHILS # BLD: 0.1 K/UL (ref 0–1)
BASOPHILS NFR BLD: 1 % (ref 0–1)
BILIRUB SERPL-MCNC: 0.5 MG/DL (ref 0.2–1)
BILIRUB UR QL: NEGATIVE
BUN SERPL-MCNC: 7 MG/DL (ref 8–23)
BUN/CREAT SERPL: 14 (ref 12–20)
CALCIUM SERPL-MCNC: 9.4 MG/DL (ref 8.8–10.2)
CHLORIDE SERPL-SCNC: 95 MMOL/L (ref 98–107)
CO2 SERPL-SCNC: 22 MMOL/L (ref 22–29)
COLOR UR: ABNORMAL
CREAT SERPL-MCNC: 0.5 MG/DL (ref 0.5–0.9)
DIFFERENTIAL METHOD BLD: ABNORMAL
EOSINOPHIL # BLD: 0.1 K/UL (ref 0–0.4)
EOSINOPHIL NFR BLD: 1 %
EPITH CASTS URNS QL MICRO: ABNORMAL /LPF
ERYTHROCYTE [DISTWIDTH] IN BLOOD BY AUTOMATED COUNT: 13 % (ref 11.5–14.5)
EST. AVERAGE GLUCOSE BLD GHB EST-MCNC: 335 MG/DL
GLOBULIN SER CALC-MCNC: 3.1 G/DL (ref 2–4)
GLUCOSE BLD STRIP.AUTO-MCNC: 286 MG/DL (ref 65–117)
GLUCOSE BLD STRIP.AUTO-MCNC: 339 MG/DL (ref 65–117)
GLUCOSE SERPL-MCNC: 341 MG/DL (ref 65–100)
GLUCOSE UR STRIP.AUTO-MCNC: >1000 MG/DL
HBA1C MFR BLD: 13.3 % (ref 4–5.6)
HCO3 BLDV-SCNC: 24.1 MMOL/L (ref 23–28)
HCT VFR BLD AUTO: 42.5 % (ref 35–47)
HGB BLD-MCNC: 14.7 G/DL (ref 11.5–16)
HGB UR QL STRIP: ABNORMAL
IMM GRANULOCYTES # BLD AUTO: 0.1 K/UL (ref 0–0.04)
IMM GRANULOCYTES NFR BLD AUTO: 2 % (ref 0–0.5)
KETONES UR QL STRIP.AUTO: >80 MG/DL
LEUKOCYTE ESTERASE UR QL STRIP.AUTO: NEGATIVE
LYMPHOCYTES # BLD: 1.9 K/UL (ref 0.8–3.5)
LYMPHOCYTES NFR BLD: 29 % (ref 12–49)
MCH RBC QN AUTO: 30.9 PG (ref 26–34)
MCHC RBC AUTO-ENTMCNC: 34.6 G/DL (ref 30–36.5)
MCV RBC AUTO: 89.3 FL (ref 80–99)
MONOCYTES # BLD: 0.4 K/UL (ref 0–1)
MONOCYTES NFR BLD: 5 % (ref 5–13)
NEUTS SEG # BLD: 3.9 K/UL (ref 1.8–8)
NEUTS SEG NFR BLD: 62 % (ref 32–75)
NITRITE UR QL STRIP.AUTO: NEGATIVE
NRBC # BLD: 0 K/UL (ref 0–0.01)
NRBC BLD-RTO: 0 PER 100 WBC
PCO2 BLDV: 41.1 MMHG (ref 41–51)
PH BLDV: 7.38 (ref 7.32–7.42)
PH UR STRIP: 5.5 (ref 5–8)
PLATELET # BLD AUTO: 227 K/UL (ref 150–400)
PMV BLD AUTO: 11.5 FL (ref 8.9–12.9)
PO2 BLDV: <27 MMHG (ref 25–40)
POTASSIUM SERPL-SCNC: 4.5 MMOL/L (ref 3.5–5.1)
PROT SERPL-MCNC: 7.3 G/DL (ref 6.4–8.3)
PROT UR STRIP-MCNC: 30 MG/DL
RBC # BLD AUTO: 4.76 M/UL (ref 3.8–5.2)
RBC #/AREA URNS HPF: >100 /HPF
SERVICE CMNT-IMP: ABNORMAL
SERVICE CMNT-IMP: ABNORMAL
SERVICE CMNT-IMP: NORMAL
SODIUM SERPL-SCNC: 133 MMOL/L (ref 136–145)
SP GR UR REFRACTOMETRY: 1.02 (ref 1–1.03)
SPECIMEN HOLD: NORMAL
SPECIMEN TYPE: NORMAL
UROBILINOGEN UR QL STRIP.AUTO: 0.2 EU/DL (ref 0.2–1)
WBC # BLD AUTO: 6.4 K/UL (ref 3.6–11)
WBC URNS QL MICRO: ABNORMAL /HPF (ref 0–4)

## 2023-09-29 PROCEDURE — 2580000003 HC RX 258

## 2023-09-29 PROCEDURE — 82962 GLUCOSE BLOOD TEST: CPT

## 2023-09-29 PROCEDURE — 83036 HEMOGLOBIN GLYCOSYLATED A1C: CPT

## 2023-09-29 PROCEDURE — 85025 COMPLETE CBC W/AUTO DIFF WBC: CPT

## 2023-09-29 PROCEDURE — 96360 HYDRATION IV INFUSION INIT: CPT

## 2023-09-29 PROCEDURE — 36415 COLL VENOUS BLD VENIPUNCTURE: CPT

## 2023-09-29 PROCEDURE — 82803 BLOOD GASES ANY COMBINATION: CPT

## 2023-09-29 PROCEDURE — 81001 URINALYSIS AUTO W/SCOPE: CPT

## 2023-09-29 PROCEDURE — 99284 EMERGENCY DEPT VISIT MOD MDM: CPT

## 2023-09-29 PROCEDURE — 80053 COMPREHEN METABOLIC PANEL: CPT

## 2023-09-29 RX ORDER — 0.9 % SODIUM CHLORIDE 0.9 %
1000 INTRAVENOUS SOLUTION INTRAVENOUS ONCE
Status: COMPLETED | OUTPATIENT
Start: 2023-09-29 | End: 2023-09-29

## 2023-09-29 RX ADMIN — SODIUM CHLORIDE 1000 ML: 9 INJECTION, SOLUTION INTRAVENOUS at 13:41

## 2023-09-29 RX ADMIN — SODIUM CHLORIDE 1000 ML: 9 INJECTION, SOLUTION INTRAVENOUS at 12:04

## 2023-09-29 ASSESSMENT — ENCOUNTER SYMPTOMS
CONSTIPATION: 0
DIARRHEA: 0
ABDOMINAL PAIN: 0
SHORTNESS OF BREATH: 0
VOMITING: 0
NAUSEA: 1

## 2023-09-29 ASSESSMENT — LIFESTYLE VARIABLES
HOW MANY STANDARD DRINKS CONTAINING ALCOHOL DO YOU HAVE ON A TYPICAL DAY: PATIENT DOES NOT DRINK
HOW OFTEN DO YOU HAVE A DRINK CONTAINING ALCOHOL: NEVER

## 2023-09-29 ASSESSMENT — PATIENT HEALTH QUESTIONNAIRE - PHQ9
SUM OF ALL RESPONSES TO PHQ QUESTIONS 1-9: 0
SUM OF ALL RESPONSES TO PHQ QUESTIONS 1-9: 0
SUM OF ALL RESPONSES TO PHQ9 QUESTIONS 1 & 2: 0
SUM OF ALL RESPONSES TO PHQ QUESTIONS 1-9: 0
1. LITTLE INTEREST OR PLEASURE IN DOING THINGS: 0
SUM OF ALL RESPONSES TO PHQ QUESTIONS 1-9: 0
2. FEELING DOWN, DEPRESSED OR HOPELESS: 0

## 2023-09-29 ASSESSMENT — PAIN - FUNCTIONAL ASSESSMENT: PAIN_FUNCTIONAL_ASSESSMENT: NONE - DENIES PAIN

## 2023-09-29 NOTE — DISCHARGE INSTRUCTIONS
As discussed, your glucose is elevated and you appear dehydrated. You are being prescribed Metformin to decrease your blood sugar. Reduce carb intake and ensure plenty of fluids. Follow up with your PCP for further management in 2 days. Return to the ER if you experience severe or worsening symptoms.

## 2023-09-29 NOTE — ED NOTES
Per provider okay to discharge after fluids without repeating blood sugar, pt verbalizes all teaching of new medication of metformin and when to follow up with pcp.       Handy Lawrence RN  09/29/23 7000

## 2023-09-29 NOTE — ED PROVIDER NOTES
[facility] EMERGENCY DEPT  EMERGENCY DEPARTMENT ENCOUNTER      Pt Name: Gaby Wei  MRN: [id number]  [address] [facility] 1960  Date of evaluation: 9/29/2023  Provider: Marshall Higgins PA-C    CHIEF COMPLAINT       Chief Complaint   Patient presents with    Hyperglycemia         HISTORY OF PRESENT ILLNESS   (Location/Symptom, Timing/Onset, Context/Setting, Quality, Duration, Modifying Factors, Severity)  Note limiting factors. HPI    Gaby Wei is a 59 y.o. female with Hx of Graves who presents ambulatory to [facility] ED with cc of hyperglycemia. Patient had labs drawn at PCP yesterday and noted to have glucose 433. Reports nausea, mild increased thirst (improving from prior). Denies any other concerns at this time. No known history of diabetes. Per patient, labs 2 years ago normal.       PCP: Lenora Gurrola MD    There are no other complaints, changes or physical findings at this time. Review of External Medical Records:     Nursing Notes were reviewed. REVIEW OF SYSTEMS    (2-9 systems for level 4, 10 or more for level 5)     Review of Systems   Constitutional:  Negative for chills and fever. HENT:  Negative for congestion. Respiratory:  Negative for shortness of breath. Cardiovascular:  Negative for chest pain. Gastrointestinal:  Positive for nausea. Negative for abdominal pain, constipation, diarrhea and vomiting. Endocrine: Positive for polydipsia. Negative for polyphagia and polyuria. Genitourinary:  Negative for dysuria and hematuria. Skin:  Negative for rash. Neurological:  Negative for dizziness, light-headedness and headaches. All other systems reviewed and are negative. Except as noted above the remainder of the review of systems was reviewed and negative.        PAST MEDICAL HISTORY     Past Medical History:   Diagnosis Date    Mild intermittent asthma     Routine Papanicolaou smear 2/13/2020 neg HPV neg    Thyroid disease          SURGICAL HISTORY       Past Surgical METFORMIN (GLUCOPHAGE) 500 MG TABLET    Take 1 tablet by mouth 2 times daily (with meals) for 7 days         (Please note that portions of this note were completed with a voice recognition program.  Efforts were made to edit the dictations but occasionally words are mis-transcribed. )    Flori Coles PA-C (electronically signed)  Emergency Attending Physician / Physician Assistant / Nurse Practitioner    Presentation, management, and disposition were discussed with the attending physician, Dr. Dhiraj Alicia, who is in agreement with plan of care.              Flori Coles PA-C  09/29/23 1528

## 2023-09-29 NOTE — ED TRIAGE NOTES
Pt presents ambulatory into ed a&ox3, no acute distress, breaths even and unlabored c/o hyperglycemia. Pt reports she got blood work  drawn at her PMD yesterday which showed her sugar was 433. No hx diabetes.  in triage. Pt states she feels fine. Denies any other symptoms.

## 2023-10-08 ENCOUNTER — HOSPITAL ENCOUNTER (EMERGENCY)
Facility: HOSPITAL | Age: 63
Discharge: HOME | End: 2023-10-08
Attending: STUDENT IN AN ORGANIZED HEALTH CARE EDUCATION/TRAINING PROGRAM
Payer: COMMERCIAL

## 2023-10-08 VITALS
WEIGHT: 165 LBS | TEMPERATURE: 98 F | OXYGEN SATURATION: 98 % | HEART RATE: 72 BPM | SYSTOLIC BLOOD PRESSURE: 146 MMHG | HEIGHT: 60 IN | RESPIRATION RATE: 18 BRPM | BODY MASS INDEX: 32.39 KG/M2 | DIASTOLIC BLOOD PRESSURE: 64 MMHG

## 2023-10-08 DIAGNOSIS — E11.65 TYPE 2 DIABETES MELLITUS WITH HYPERGLYCEMIA, WITHOUT LONG-TERM CURRENT USE OF INSULIN (HCC): Primary | ICD-10-CM

## 2023-10-08 LAB
ALBUMIN SERPL-MCNC: 4 G/DL (ref 3.5–5.2)
ALBUMIN/GLOB SERPL: 1.6 (ref 1.1–2.2)
ALP SERPL-CCNC: 122 U/L (ref 35–104)
ALT SERPL-CCNC: 15 U/L (ref 10–35)
ANION GAP BLD CALC-SCNC: ABNORMAL (ref 10–20)
ANION GAP SERPL CALC-SCNC: 11 MMOL/L (ref 5–15)
APPEARANCE UR: CLEAR
AST SERPL-CCNC: 18 U/L (ref 10–35)
B-OH-BUTYR SERPL-SCNC: 0.69 MMOL/L
BACTERIA URNS QL MICRO: NEGATIVE /HPF
BASE DEFICIT BLD-SCNC: 0.2 MMOL/L
BASOPHILS # BLD: 0.1 K/UL (ref 0–1)
BASOPHILS NFR BLD: 1 % (ref 0–1)
BILIRUB SERPL-MCNC: 0.4 MG/DL (ref 0.2–1)
BILIRUB UR QL: NEGATIVE
BUN SERPL-MCNC: 9 MG/DL (ref 8–23)
BUN/CREAT SERPL: 13 (ref 12–20)
CALCIUM SERPL-MCNC: 9.5 MG/DL (ref 8.8–10.2)
CHLORIDE SERPL-SCNC: 97 MMOL/L (ref 98–107)
CO2 SERPL-SCNC: 24 MMOL/L (ref 22–29)
COLOR UR: ABNORMAL
CREAT SERPL-MCNC: 0.71 MG/DL (ref 0.5–0.9)
DIFFERENTIAL METHOD BLD: ABNORMAL
EOSINOPHIL # BLD: 0.1 K/UL (ref 0–0.4)
EOSINOPHIL NFR BLD: 1 %
EPITH CASTS URNS QL MICRO: ABNORMAL /LPF
ERYTHROCYTE [DISTWIDTH] IN BLOOD BY AUTOMATED COUNT: 13 % (ref 11.5–14.5)
GLOBULIN SER CALC-MCNC: 2.5 G/DL (ref 2–4)
GLUCOSE BLD STRIP.AUTO-MCNC: 344 MG/DL (ref 65–117)
GLUCOSE BLD STRIP.AUTO-MCNC: 422 MG/DL (ref 65–117)
GLUCOSE SERPL-MCNC: 481 MG/DL (ref 65–100)
GLUCOSE UR STRIP.AUTO-MCNC: >1000 MG/DL
HCO3 BLDA-SCNC: 25 MMOL/L
HCT VFR BLD AUTO: 39.7 % (ref 35–47)
HGB BLD-MCNC: 13.4 G/DL (ref 11.5–16)
HGB UR QL STRIP: NEGATIVE
IMM GRANULOCYTES # BLD AUTO: 0 K/UL (ref 0–0.04)
IMM GRANULOCYTES NFR BLD AUTO: 1 % (ref 0–0.5)
KETONES UR QL STRIP.AUTO: 15 MG/DL
LEUKOCYTE ESTERASE UR QL STRIP.AUTO: NEGATIVE
LYMPHOCYTES # BLD: 1.5 K/UL (ref 0.8–3.5)
LYMPHOCYTES NFR BLD: 27 % (ref 12–49)
MCH RBC QN AUTO: 30.9 PG (ref 26–34)
MCHC RBC AUTO-ENTMCNC: 33.8 G/DL (ref 30–36.5)
MCV RBC AUTO: 91.7 FL (ref 80–99)
MONOCYTES # BLD: 0.4 K/UL (ref 0–1)
MONOCYTES NFR BLD: 6 % (ref 5–13)
NEUTS SEG # BLD: 3.7 K/UL (ref 1.8–8)
NEUTS SEG NFR BLD: 64 % (ref 32–75)
NITRITE UR QL STRIP.AUTO: NEGATIVE
NRBC # BLD: 0 K/UL (ref 0–0.01)
NRBC BLD-RTO: 0 PER 100 WBC
PCO2 BLDV: 43.2 MMHG (ref 41–51)
PH BLDV: 7.38 (ref 7.32–7.42)
PH UR STRIP: 6.5 (ref 5–8)
PLATELET # BLD AUTO: 207 K/UL (ref 150–400)
PMV BLD AUTO: 11.7 FL (ref 8.9–12.9)
PO2 BLDV: 46 MMHG (ref 25–40)
POTASSIUM BLD-SCNC: 3.9 MMOL/L (ref 3.5–5.5)
POTASSIUM SERPL-SCNC: 4.1 MMOL/L (ref 3.5–5.1)
PROT SERPL-MCNC: 6.5 G/DL (ref 6.4–8.3)
PROT UR STRIP-MCNC: NEGATIVE MG/DL
RBC # BLD AUTO: 4.33 M/UL (ref 3.8–5.2)
RBC #/AREA URNS HPF: ABNORMAL /HPF
SAO2 % BLD: 80 %
SERVICE CMNT-IMP: ABNORMAL
SERVICE CMNT-IMP: ABNORMAL
SODIUM SERPL-SCNC: 132 MMOL/L (ref 136–145)
SP GR UR REFRACTOMETRY: 1.01 (ref 1–1.03)
SPECIMEN HOLD: NORMAL
SPECIMEN SITE: ABNORMAL
UROBILINOGEN UR QL STRIP.AUTO: 0.2 EU/DL (ref 0.2–1)
WBC # BLD AUTO: 5.8 K/UL (ref 3.6–11)
WBC URNS QL MICRO: ABNORMAL /HPF (ref 0–4)

## 2023-10-08 PROCEDURE — 36415 COLL VENOUS BLD VENIPUNCTURE: CPT

## 2023-10-08 PROCEDURE — 2580000003 HC RX 258: Performed by: EMERGENCY MEDICINE

## 2023-10-08 PROCEDURE — 96360 HYDRATION IV INFUSION INIT: CPT

## 2023-10-08 PROCEDURE — 99284 EMERGENCY DEPT VISIT MOD MDM: CPT

## 2023-10-08 PROCEDURE — 82010 KETONE BODYS QUAN: CPT

## 2023-10-08 PROCEDURE — 81001 URINALYSIS AUTO W/SCOPE: CPT

## 2023-10-08 PROCEDURE — 82803 BLOOD GASES ANY COMBINATION: CPT

## 2023-10-08 PROCEDURE — 85025 COMPLETE CBC W/AUTO DIFF WBC: CPT

## 2023-10-08 PROCEDURE — 84132 ASSAY OF SERUM POTASSIUM: CPT

## 2023-10-08 PROCEDURE — 82962 GLUCOSE BLOOD TEST: CPT

## 2023-10-08 PROCEDURE — 96361 HYDRATE IV INFUSION ADD-ON: CPT

## 2023-10-08 PROCEDURE — 6370000000 HC RX 637 (ALT 250 FOR IP): Performed by: STUDENT IN AN ORGANIZED HEALTH CARE EDUCATION/TRAINING PROGRAM

## 2023-10-08 PROCEDURE — 80053 COMPREHEN METABOLIC PANEL: CPT

## 2023-10-08 PROCEDURE — 82947 ASSAY GLUCOSE BLOOD QUANT: CPT

## 2023-10-08 PROCEDURE — 82330 ASSAY OF CALCIUM: CPT

## 2023-10-08 PROCEDURE — 84295 ASSAY OF SERUM SODIUM: CPT

## 2023-10-08 RX ORDER — 0.9 % SODIUM CHLORIDE 0.9 %
1000 INTRAVENOUS SOLUTION INTRAVENOUS ONCE
Status: COMPLETED | OUTPATIENT
Start: 2023-10-08 | End: 2023-10-08

## 2023-10-08 RX ADMIN — Medication 5 UNITS: at 19:47

## 2023-10-08 RX ADMIN — SODIUM CHLORIDE 1000 ML: 9 INJECTION, SOLUTION INTRAVENOUS at 18:51

## 2023-10-08 ASSESSMENT — PAIN - FUNCTIONAL ASSESSMENT: PAIN_FUNCTIONAL_ASSESSMENT: NONE - DENIES PAIN

## 2023-10-08 NOTE — ED TRIAGE NOTES
Patient arrives with with c/o elevated blood sugar, with level reading greater than 500. BG in triage: 422. Reports mild blurred vision. States that she was diagnosed with cancer on Thursday, but is in process of identifying what type of cancer. Reports hx of type 2 DM, and take metformin.      Denies N/V, urinary frequency, increased thirst.

## 2023-10-08 NOTE — ED PROVIDER NOTES
Notable for the following components:    Sodium 132 (*)     Chloride 97 (*)     Glucose 481 (*)     Alk Phosphatase 122 (*)     All other components within normal limits   BETA-HYDROXYBUTYRATE - Abnormal; Notable for the following components:    Beta-Hydroxybutyrate 0.69 (*)     All other components within normal limits   URINALYSIS WITH MICROSCOPIC - Abnormal; Notable for the following components:    Glucose, UA >1000 (*)     Ketones, Urine 15 (*)     All other components within normal limits   POCT GLUCOSE - Abnormal; Notable for the following components:    POC Glucose 422 (*)     All other components within normal limits   POCT BLOOD GAS & ELECTROLYTES - Abnormal; Notable for the following components:    PO2, VENOUS (POC) 46 (*)     All other components within normal limits   POCT GLUCOSE - Abnormal; Notable for the following components:    POC Glucose 344 (*)     All other components within normal limits   URINE CULTURE HOLD SAMPLE   BLOOD GAS, VENOUS   POCT GLUCOSE     ED physician interpretation of laboratory results: Documented in ED course    Imaging Results:  No orders to display     ED physician interpretation of imaging: Documented in ED course    Medications Given:  Medications   sodium chloride 0.9 % bolus 1,000 mL (0 mLs IntraVENous Stopped 10/8/23 2030)   insulin regular (HUMULIN R;NOVOLIN R) injection 5 Units (5 Units SubCUTAneous Given 10/8/23 1947)       Differential Diagnosis included but not limited to: Hyperglycemia, DKA, diabetes type 2    Medical Decision Making  Patient is a 59-year-old female presented to ED for elevated blood glucose. Glucose was greater than 500 at home. Patient reports recent diagnosis of diabetes however patient's A1c is 13.3. No signs of DKA here in the ED. Patient is generally asymptomatic. IV fluids and subcutaneous insulin given. Patient's sugar returned to baseline in the 340s.   Patient structured to continue metformin and follow-up closely with her PCP for

## 2023-10-09 NOTE — DISCHARGE INSTRUCTIONS
You presented to ED with your home blood glucose meter reading high. Your hemoglobin A1c is 13.3 which means your average blood glucose for the last 2 to 3 months has been 335. No signs of diabetic ketoacidosis or severe complications of hyperglycemia. Please contact your primary care provider tomorrow for further medical management of your diabetes/hyperglycemia. Continue to take your metformin as prescribed. Return to the ED if you start having fevers, shortness of breath or confusion.

## 2023-10-10 LAB — HBA1C MFR BLD HPLC: 15.5 %

## 2023-11-23 LAB
T3 SERPL-MCNC: 139 NG/DL (ref 71–180)
T4 FREE SERPL-MCNC: 1.04 NG/DL (ref 0.82–1.77)
TSH SERPL DL<=0.005 MIU/L-ACNC: 7.6 UIU/ML (ref 0.45–4.5)

## 2024-01-08 ENCOUNTER — OFFICE VISIT (OUTPATIENT)
Age: 64
End: 2024-01-08
Payer: COMMERCIAL

## 2024-01-08 VITALS
SYSTOLIC BLOOD PRESSURE: 134 MMHG | BODY MASS INDEX: 34.59 KG/M2 | WEIGHT: 176.2 LBS | DIASTOLIC BLOOD PRESSURE: 79 MMHG | HEIGHT: 60 IN | OXYGEN SATURATION: 97 % | HEART RATE: 74 BPM | RESPIRATION RATE: 18 BRPM | TEMPERATURE: 97.8 F

## 2024-01-08 DIAGNOSIS — E11.65 TYPE 2 DIABETES MELLITUS WITH HYPERGLYCEMIA, UNSPECIFIED WHETHER LONG TERM INSULIN USE (HCC): ICD-10-CM

## 2024-01-08 DIAGNOSIS — E04.1 THYROID NODULE: Primary | ICD-10-CM

## 2024-01-08 DIAGNOSIS — E03.8 SUBCLINICAL HYPOTHYROIDISM: ICD-10-CM

## 2024-01-08 LAB
T4 FREE SERPL-MCNC: 0.9 NG/DL (ref 0.8–1.5)
TSH SERPL DL<=0.05 MIU/L-ACNC: 9.13 UIU/ML (ref 0.36–3.74)

## 2024-01-08 PROCEDURE — 95251 CONT GLUC MNTR ANALYSIS I&R: CPT | Performed by: INTERNAL MEDICINE

## 2024-01-08 PROCEDURE — 99214 OFFICE O/P EST MOD 30 MIN: CPT | Performed by: INTERNAL MEDICINE

## 2024-01-08 RX ORDER — SEMAGLUTIDE 1.34 MG/ML
INJECTION, SOLUTION SUBCUTANEOUS WEEKLY
COMMUNITY

## 2024-01-08 NOTE — PROGRESS NOTES
[facility] DIABETES AND ENDOCRINOLOGY                 Lady Lind MD         Patient Information   Date:1/30/2021   Name : Sobia Pantoja 60 y.o.       YOB: 1960           Referred by: Valeria Galvez MD            Chief Complaint   Patient presents with    Diabetes               History of present illness      Sobia Pantoja is a 60 y.o.  female here for follow-up of hyperthyroidism     She was found to have low TSH with normal free T4 in November 2018, repeated  in January 2019.  TSH suppressed in September 2019   Thyroid uptake and scan showed elevated uptake of 36%, no dominant nodule on ultrasound   Chronic tremors which has not changed  She was treated with methimazole, off methimazole in 2021    She was diagnosed with diabetes mellitus September 2023, A1c 15, on Ozempic, not able to tolerate 0.25 mg, on metformin  Has micheal  Blood glucose has improved      No A fib or osteoporosis      Mother had nodular goiter, status post lobectomy, no history of thyroid cancer          Physical Examination:    General: pleasant, no distress, good eye contact  HEENT: no exophthalmos, no periorbital edema, EOMI  Neck: No visible thyromegaly  RS: Normal respiratory effort  Musculoskeletal: no tremors  Neurological: alert and oriented  Psychiatric: normal mood and affect  Skin: Normal color   Data Reviewed:        Lab Results   Component Value Date    TSH 7.600 (H) 11/22/2023    [id number] 139 11/22/2023    T4FREE 1.04 11/22/2023       No results found for: \"TRAB\", \"TSI\", \"TSILT\", \"TPO\"       [] Reviewed labs      Assessment/Plan:          Graves' disease: S/p methimazole, discontinued in 2021    Subclinical hypothyroidism: No need for any therapy  TSH November 2023 7, free T4 normal  Plan to start levothyroxine if TSH is close to 10  Labs today         Thyroid nodule: Subcentimeter, no follow-up ultrasound needed      Nonmorbid obesity: Lifestyle changes      Type 2 DM - Dx September

## 2024-01-08 NOTE — PROGRESS NOTES
Sobia Pantoja is a 63 y.o. female here for   Chief Complaint   Patient presents with    Thyroid Problem       1. Have you been to the ER, urgent care clinic since your last visit?  Hospitalized since your last visit? - Urgent Care for High Blood Sugar x2. BS was over 400    2. Have you seen or consulted any other health care providers outside of the [facility] since your last visit?  Include any pap smears or colon screening.-   PCP  [facility]-Dr. Bladimir Aguayo MD Full Hysterectomy 11/7/2023

## 2024-01-09 ENCOUNTER — TELEPHONE (OUTPATIENT)
Age: 64
End: 2024-01-09

## 2024-01-09 DIAGNOSIS — E03.8 SUBCLINICAL HYPOTHYROIDISM: Primary | ICD-10-CM

## 2024-01-09 RX ORDER — LEVOTHYROXINE SODIUM 0.03 MG/1
25 TABLET ORAL
Qty: 90 TABLET | Refills: 3 | Status: SHIPPED | OUTPATIENT
Start: 2024-01-09

## 2024-01-09 NOTE — RESULT ENCOUNTER NOTE
Thyroid is slowing down, more than last time.  I would recommend levothyroxine 25 mcg before breakfast on an empty stomach, with water, wait half hour before eating or taking any other medications [Mother] : mother [Parents] : parents [FreeTextEntry1] : 1 mo old doing well

## 2024-01-09 NOTE — TELEPHONE ENCOUNTER
Per Dr. Lind, informed pt of result note, as noted above. Pt verbalized understanding with no further questions or concerns at this time.

## 2024-01-09 NOTE — TELEPHONE ENCOUNTER
Attempted to call. Unsuccessful. Left [facility] for Sobia Pantoja to give us a call back at the office. A callback number was left.

## 2024-01-09 NOTE — TELEPHONE ENCOUNTER
----- Message from Lady Lind MD sent at 1/9/2024  9:09 AM EST -----  Thyroid is slowing down, more than last time.  I would recommend levothyroxine 25 mcg before breakfast on an empty stomach, with water, wait half hour before eating or taking any other medications

## 2024-04-17 LAB — HBA1C MFR BLD HPLC: 5.8 %

## 2024-05-30 DIAGNOSIS — E04.1 THYROID NODULE: ICD-10-CM

## 2024-05-30 DIAGNOSIS — E03.8 SUBCLINICAL HYPOTHYROIDISM: Primary | ICD-10-CM

## 2024-06-02 LAB
T4 FREE SERPL-MCNC: 1.19 NG/DL (ref 0.82–1.77)
TSH SERPL DL<=0.005 MIU/L-ACNC: 5.88 UIU/ML (ref 0.45–4.5)

## 2024-06-10 ENCOUNTER — OFFICE VISIT (OUTPATIENT)
Age: 64
End: 2024-06-10
Payer: COMMERCIAL

## 2024-06-10 VITALS
OXYGEN SATURATION: 98 % | HEIGHT: 60 IN | SYSTOLIC BLOOD PRESSURE: 135 MMHG | RESPIRATION RATE: 20 BRPM | HEART RATE: 67 BPM | WEIGHT: 184.1 LBS | BODY MASS INDEX: 36.15 KG/M2 | DIASTOLIC BLOOD PRESSURE: 67 MMHG | TEMPERATURE: 97.8 F

## 2024-06-10 DIAGNOSIS — E03.9 PRIMARY HYPOTHYROIDISM: ICD-10-CM

## 2024-06-10 DIAGNOSIS — E11.65 TYPE 2 DIABETES MELLITUS WITH HYPERGLYCEMIA, UNSPECIFIED WHETHER LONG TERM INSULIN USE (HCC): Primary | ICD-10-CM

## 2024-06-10 DIAGNOSIS — E78.2 MIXED HYPERLIPIDEMIA: ICD-10-CM

## 2024-06-10 PROCEDURE — 99214 OFFICE O/P EST MOD 30 MIN: CPT | Performed by: INTERNAL MEDICINE

## 2024-06-10 PROCEDURE — 95251 CONT GLUC MNTR ANALYSIS I&R: CPT | Performed by: INTERNAL MEDICINE

## 2024-06-10 ASSESSMENT — PATIENT HEALTH QUESTIONNAIRE - PHQ9
SUM OF ALL RESPONSES TO PHQ9 QUESTIONS 1 & 2: 0
SUM OF ALL RESPONSES TO PHQ QUESTIONS 1-9: 0
SUM OF ALL RESPONSES TO PHQ QUESTIONS 1-9: 0
1. LITTLE INTEREST OR PLEASURE IN DOING THINGS: NOT AT ALL
2. FEELING DOWN, DEPRESSED OR HOPELESS: NOT AT ALL
SUM OF ALL RESPONSES TO PHQ QUESTIONS 1-9: 0
SUM OF ALL RESPONSES TO PHQ QUESTIONS 1-9: 0

## 2024-06-10 NOTE — PROGRESS NOTES
Identified pt with two pt identifiers(name and ). Reviewed record in preparation for visit and have obtained necessary documentation. All patient medications has been reviewed.  Chief Complaint   Patient presents with    Thyroid Problem     nodule       Vitals:    06/10/24 0913   BP: 135/67   Pulse:    Resp:    Temp:    SpO2:                    Coordination of Care Questionnaire:   1) Have you been to an emergency room, urgent care, or hospitalized since your last visit?   No       2. Have seen or consulted any other health care provider since your last visit? Yes, PCP & Oncologist         Patient is accompanied by self I have received verbal consent from Sobia Pantoja to discuss any/all medical information while they are present in the room.

## 2024-06-10 NOTE — PROGRESS NOTES
[facility] DIABETES AND ENDOCRINOLOGY                 Lady Lind MD         Patient Information   Date:1/30/2021   Name : Sobia Pantoja 60 y.o.       YOB: 1960           Referred by: Valeria Galvez MD            Chief Complaint   Patient presents with    Thyroid Problem     nodule               History of present illness      Sobia Pantoja is a 60 y.o.  female here for follow-up    Type 2 diabetes mellitus diagnosed in September 2023  On Ozempic  On metformin  Eating healthy    Hypothyroidism: Status post methimazole, now hypothyroid           No A fib or osteoporosis      Mother had nodular goiter, status post lobectomy, no history of thyroid cancer          Physical Examination:    General: pleasant, no distress, good eye contact  HEENT: no exophthalmos, no periorbital edema, EOMI  Neck: No visible thyromegaly  RS: Normal respiratory effort  Musculoskeletal: no tremors  Neurological: alert and oriented  Psychiatric: normal mood and affect  Skin: Normal color   Data Reviewed:        Lab Results   Component Value Date    TSH 5.880 (H) 06/01/2024    [id number] 139 11/22/2023    T4FREE 1.19 06/01/2024       No results found for: \"TRAB\", \"TSI\", \"TSILT\", \"TPO\"       [] Reviewed labs      Assessment/Plan:          Graves' disease: S/p methimazole, discontinued in 2021    Hypothyroidism: Avoided therapy until TSH was close to 10  25 mcg levothyroxine  Taking it consistently   TSH not at goal , she wants to wait till next visit        Thyroid nodule: Subcentimeter, no follow-up ultrasound needed      Nonmorbid obesity: Lifestyle changes      Type 2 DM - Dx September 2023  Metformin   Ozempic 0.25 mg ,  Continue to follow-up with PCP  Hydration  Good bowel regimen  Continuous glucose monitor data downloaded for 2 weeks and reviewed with the patient  No postprandial hyperglycemia, no hypoglycemia  98 % in target          Thank you for allowing me to participate in the care of this

## 2024-10-07 LAB — HBA1C MFR BLD: 6.6 % (ref 4.8–5.6)

## 2024-10-08 LAB
T4 FREE SERPL-MCNC: 1.24 NG/DL (ref 0.82–1.77)
TSH SERPL DL<=0.005 MIU/L-ACNC: 5.72 UIU/ML (ref 0.45–4.5)

## 2024-10-14 ENCOUNTER — OFFICE VISIT (OUTPATIENT)
Age: 64
End: 2024-10-14
Payer: COMMERCIAL

## 2024-10-14 VITALS
SYSTOLIC BLOOD PRESSURE: 131 MMHG | HEART RATE: 74 BPM | HEIGHT: 60 IN | OXYGEN SATURATION: 96 % | WEIGHT: 188.7 LBS | BODY MASS INDEX: 37.05 KG/M2 | TEMPERATURE: 98.4 F | DIASTOLIC BLOOD PRESSURE: 65 MMHG

## 2024-10-14 DIAGNOSIS — E03.9 PRIMARY HYPOTHYROIDISM: ICD-10-CM

## 2024-10-14 DIAGNOSIS — E11.65 TYPE 2 DIABETES MELLITUS WITH HYPERGLYCEMIA, UNSPECIFIED WHETHER LONG TERM INSULIN USE (HCC): Primary | ICD-10-CM

## 2024-10-14 PROCEDURE — 99214 OFFICE O/P EST MOD 30 MIN: CPT | Performed by: INTERNAL MEDICINE

## 2024-10-14 PROCEDURE — 95251 CONT GLUC MNTR ANALYSIS I&R: CPT | Performed by: INTERNAL MEDICINE

## 2024-10-14 PROCEDURE — 3044F HG A1C LEVEL LT 7.0%: CPT | Performed by: INTERNAL MEDICINE

## 2024-10-14 RX ORDER — ATORVASTATIN CALCIUM 10 MG/1
10 TABLET, FILM COATED ORAL DAILY
COMMUNITY

## 2024-10-14 RX ORDER — LEVOTHYROXINE SODIUM 50 UG/1
50 TABLET ORAL
Qty: 90 TABLET | Refills: 2 | Status: SHIPPED | OUTPATIENT
Start: 2024-10-14

## 2024-10-14 NOTE — PROGRESS NOTES
the grammar and contents.  Even though attempts were made to correct  all the mistakes, some may have been missed and remained in the body of the report.

## 2025-01-10 PROBLEM — E55.9 VITAMIN D DEFICIENCY: Status: ACTIVE | Noted: 2025-01-10

## 2025-01-10 PROBLEM — J45.20 MILD INTERMITTENT ASTHMA: Status: ACTIVE | Noted: 2025-01-10

## 2025-01-10 PROBLEM — C54.1 ENDOMETRIAL CANCER (HCC): Status: ACTIVE | Noted: 2025-01-10

## 2025-01-15 LAB
ALBUMIN/CREAT UR: <6 MG/G CREAT (ref 0–29)
CHOLEST SERPL-MCNC: 124 MG/DL (ref 100–199)
CREAT UR-MCNC: 52.4 MG/DL
HBA1C MFR BLD: 6.4 % (ref 4.8–5.6)
HDLC SERPL-MCNC: 44 MG/DL
LDLC SERPL CALC-MCNC: 58 MG/DL (ref 0–99)
MICROALBUMIN UR-MCNC: <3 UG/ML
T4 FREE SERPL-MCNC: 1.3 NG/DL (ref 0.82–1.77)
TRIGL SERPL-MCNC: 121 MG/DL (ref 0–149)
TSH SERPL DL<=0.005 MIU/L-ACNC: 5.2 UIU/ML (ref 0.45–4.5)
VLDLC SERPL CALC-MCNC: 22 MG/DL (ref 5–40)

## 2025-01-16 LAB
BUN SERPL-MCNC: 10 MG/DL (ref 8–27)
BUN/CREAT SERPL: 11 (ref 12–28)
CALCIUM SERPL-MCNC: 9.7 MG/DL (ref 8.7–10.3)
CHLORIDE SERPL-SCNC: 99 MMOL/L (ref 96–106)
CO2 SERPL-SCNC: 24 MMOL/L (ref 20–29)
CREAT SERPL-MCNC: 0.91 MG/DL (ref 0.57–1)
EGFRCR SERPLBLD CKD-EPI 2021: 71 ML/MIN/1.73
GLUCOSE SERPL-MCNC: 122 MG/DL (ref 70–99)
IMP & REVIEW OF LAB RESULTS: NORMAL
Lab: NORMAL
POTASSIUM SERPL-SCNC: 4.2 MMOL/L (ref 3.5–5.2)
SODIUM SERPL-SCNC: 137 MMOL/L (ref 134–144)

## 2025-01-20 ENCOUNTER — OFFICE VISIT (OUTPATIENT)
Age: 65
End: 2025-01-20
Payer: COMMERCIAL

## 2025-01-20 VITALS
TEMPERATURE: 98 F | HEIGHT: 60 IN | OXYGEN SATURATION: 95 % | BODY MASS INDEX: 38.5 KG/M2 | WEIGHT: 196.1 LBS | HEART RATE: 69 BPM | RESPIRATION RATE: 20 BRPM | DIASTOLIC BLOOD PRESSURE: 81 MMHG | SYSTOLIC BLOOD PRESSURE: 160 MMHG

## 2025-01-20 DIAGNOSIS — E03.9 PRIMARY HYPOTHYROIDISM: ICD-10-CM

## 2025-01-20 DIAGNOSIS — E03.9 PRIMARY HYPOTHYROIDISM: Primary | ICD-10-CM

## 2025-01-20 DIAGNOSIS — E11.65 TYPE 2 DIABETES MELLITUS WITH HYPERGLYCEMIA, WITHOUT LONG-TERM CURRENT USE OF INSULIN (HCC): Primary | ICD-10-CM

## 2025-01-20 PROCEDURE — 3044F HG A1C LEVEL LT 7.0%: CPT | Performed by: INTERNAL MEDICINE

## 2025-01-20 PROCEDURE — G2211 COMPLEX E/M VISIT ADD ON: HCPCS | Performed by: INTERNAL MEDICINE

## 2025-01-20 PROCEDURE — 99214 OFFICE O/P EST MOD 30 MIN: CPT | Performed by: INTERNAL MEDICINE

## 2025-01-20 RX ORDER — ACYCLOVIR 800 MG/1
TABLET ORAL
COMMUNITY
Start: 2024-12-30

## 2025-01-20 RX ORDER — LEVOTHYROXINE SODIUM 50 UG/1
TABLET ORAL
Qty: 102 TABLET | Refills: 3 | Status: SHIPPED | OUTPATIENT
Start: 2025-01-20

## 2025-01-20 NOTE — PROGRESS NOTES
[facility] DIABETES AND ENDOCRINOLOGY                 Lady Lind MD           Referred by: Valeria Galvez MD         The patient (or guardian, if applicable) and other individuals in attendance with the patient were advised that Artificial Intelligence will be utilized during this visit to record, process the conversation to generate a clinical note, and support improvement of the AI technology. The patient (or guardian, if applicable) and other individuals in attendance at the appointment consented to the use of AI, including the recording.       Chief Complaint   Patient presents with    Diabetes              History of Present Illness  The patient presents for evaluation of diabetes and hypothyroidism.    She tolerates Ozempic well without adverse effects. Due to weather, she has been unable to maintain her desired physical activity but is making efforts to resume. She frequently monitors her blood glucose and wants to increase her Ozempic dosage.    She reports no missed doses of levothyroxine 50 mcg, taken on an empty stomach upon waking. No lumps or difficulty swallowing reported.         Prior hx     Type 2 diabetes mellitus diagnosed in September 2023    Hyperthyroidism: Status post methimazole, now hypothyroid       No A fib or osteoporosis      Mother had nodular goiter, status post lobectomy, no history of thyroid cancer          Physical Examination:    General: pleasant, no distress, good eye contact  HEENT: no exophthalmos, no periorbital edema, EOMI  Neck: No visible thyromegaly  RS: Normal respiratory effort  Musculoskeletal: no tremors  Neurological: alert and oriented  Psychiatric: normal mood and affect  Skin: Normal color   Data Reviewed:        Lab Results   Component Value Date    TSH 5.200 (H) 01/14/2025    [id number] 139 11/22/2023    T4FREE 1.30 01/14/2025       No results found for: \"TRAB\", \"TSI\", \"TSILT\", \"TPO\"       [] Reviewed labs      Assessment/Plan:

## 2025-01-20 NOTE — PROGRESS NOTES
Sobia Pantoja is a 64 y.o. female here for   Chief Complaint   Patient presents with    Diabetes       1. Have you been to the ER, urgent care clinic since your last visit?  Hospitalized since your last visit? - no    2. Have you seen or consulted any other health care providers outside of the [facility] since your last visit?  Include any pap smears or colon screening.- no

## 2025-05-01 LAB
HBA1C MFR BLD: 6.5 % (ref 4.8–5.6)
T4 FREE SERPL-MCNC: 1.3 NG/DL (ref 0.82–1.77)
TSH SERPL DL<=0.005 MIU/L-ACNC: 5.77 UIU/ML (ref 0.45–4.5)

## 2025-05-21 ENCOUNTER — OFFICE VISIT (OUTPATIENT)
Age: 65
End: 2025-05-21
Payer: COMMERCIAL

## 2025-05-21 VITALS
RESPIRATION RATE: 18 BRPM | HEART RATE: 71 BPM | SYSTOLIC BLOOD PRESSURE: 142 MMHG | HEIGHT: 60 IN | DIASTOLIC BLOOD PRESSURE: 74 MMHG | OXYGEN SATURATION: 93 % | TEMPERATURE: 97.1 F | WEIGHT: 195 LBS | BODY MASS INDEX: 38.28 KG/M2

## 2025-05-21 DIAGNOSIS — E78.2 MIXED HYPERLIPIDEMIA: ICD-10-CM

## 2025-05-21 DIAGNOSIS — E03.9 PRIMARY HYPOTHYROIDISM: ICD-10-CM

## 2025-05-21 DIAGNOSIS — E11.65 TYPE 2 DIABETES MELLITUS WITH HYPERGLYCEMIA, WITHOUT LONG-TERM CURRENT USE OF INSULIN (HCC): Primary | ICD-10-CM

## 2025-05-21 PROCEDURE — 99214 OFFICE O/P EST MOD 30 MIN: CPT | Performed by: INTERNAL MEDICINE

## 2025-05-21 PROCEDURE — G2211 COMPLEX E/M VISIT ADD ON: HCPCS | Performed by: INTERNAL MEDICINE

## 2025-05-21 PROCEDURE — 3044F HG A1C LEVEL LT 7.0%: CPT | Performed by: INTERNAL MEDICINE

## 2025-05-21 NOTE — PROGRESS NOTES
[facility] DIABETES [facility] ENDOCRINOLOGY                 Lady Lind MD           Referred by: Valeria Galvez MD         The patient (or guardian, if applicable) and other individuals in attendance with the patient were advised that Artificial Intelligence will be utilized during this visit to record, process the conversation to generate a clinical note, and support improvement of the AI technology. The patient (or guardian, if applicable) and other individuals in attendance at the appointment consented to the use of AI, including the recording.       Chief Complaint   Patient presents with    Diabetes              History of Present Illness  The patient presents for evaluation of diabetes and hypothyroidism.    She is taking Ozempic 0.5 mg, taking levothyroxine consistently  Uses freestyle micheal       Prior hx     Type 2 diabetes mellitus diagnosed in September 2023    Hyperthyroidism: Status post methimazole, now hypothyroid       No A fib or osteoporosis      Mother had nodular goiter, status post lobectomy, no history of thyroid cancer          Physical Examination:    General: pleasant, no distress, good eye contact  HEENT: no exophthalmos, no periorbital edema, EOMI  Neck: No visible thyromegaly  RS: Normal respiratory effort  Musculoskeletal: no tremors  Neurological: alert and oriented  Psychiatric: normal mood and affect  Skin: Normal color   Data Reviewed:        Lab Results   Component Value Date    TSH 5.770 (H) 04/30/2025    E0HVYTP 139 11/22/2023    T4FREE 1.30 04/30/2025       No results found for: \"TRAB\", \"TSI\", \"TSILT\", \"TPO\"       [] Reviewed labs      Assessment/Plan:          1.Graves' disease: S/p methimazole, discontinued in 2021  Now hypothyroid,  50 mcg levothyroxine, 2 tabs on Sunday (January 2025 TSH 5)  Misunderstood the instructions, she was taking 1 tablet all 7 days, TSH still fine       Thyroid nodule: Subcentimeter, no follow-up ultrasound needed

## 2025-05-21 NOTE — PROGRESS NOTES
Sobia Pantoja is a 65 y.o. female here for   Chief Complaint   Patient presents with    Diabetes       1. Have you been to the ER, urgent care clinic since your last visit?  Hospitalized since your last visit? -no    2. Have you seen or consulted any other health care providers outside of the [facility] since your last visit?  Include any pap smears or colon screening.-PCP and Oncologist